# Patient Record
Sex: FEMALE | Race: WHITE
[De-identification: names, ages, dates, MRNs, and addresses within clinical notes are randomized per-mention and may not be internally consistent; named-entity substitution may affect disease eponyms.]

---

## 2019-10-13 ENCOUNTER — HOSPITAL ENCOUNTER (EMERGENCY)
Dept: HOSPITAL 75 - ER | Age: 29
Discharge: HOME | End: 2019-10-13
Payer: SELF-PAY

## 2019-10-13 VITALS — HEIGHT: 62.99 IN | BODY MASS INDEX: 39.06 KG/M2 | WEIGHT: 220.46 LBS

## 2019-10-13 VITALS — DIASTOLIC BLOOD PRESSURE: 68 MMHG | SYSTOLIC BLOOD PRESSURE: 135 MMHG

## 2019-10-13 DIAGNOSIS — K76.89: ICD-10-CM

## 2019-10-13 DIAGNOSIS — N12: Primary | ICD-10-CM

## 2019-10-13 DIAGNOSIS — Z87.42: ICD-10-CM

## 2019-10-13 DIAGNOSIS — Z87.442: ICD-10-CM

## 2019-10-13 DIAGNOSIS — I10: ICD-10-CM

## 2019-10-13 DIAGNOSIS — Z88.1: ICD-10-CM

## 2019-10-13 LAB
ALBUMIN SERPL-MCNC: 4.3 GM/DL (ref 3.2–4.5)
ALP SERPL-CCNC: 91 U/L (ref 40–136)
ALT SERPL-CCNC: 26 U/L (ref 0–55)
APTT PPP: YELLOW S
BACTERIA #/AREA URNS HPF: (no result) /HPF
BASOPHILS # BLD AUTO: 0.1 10^3/UL (ref 0–0.1)
BASOPHILS NFR BLD AUTO: 1 % (ref 0–10)
BILIRUB SERPL-MCNC: 0.4 MG/DL (ref 0.1–1)
BILIRUB UR QL STRIP: NEGATIVE
BUN/CREAT SERPL: 14
CALCIUM SERPL-MCNC: 8.8 MG/DL (ref 8.5–10.1)
CHLORIDE SERPL-SCNC: 104 MMOL/L (ref 98–107)
CO2 SERPL-SCNC: 24 MMOL/L (ref 21–32)
CREAT SERPL-MCNC: 0.84 MG/DL (ref 0.6–1.3)
EOSINOPHIL # BLD AUTO: 0.1 10^3/UL (ref 0–0.3)
EOSINOPHIL NFR BLD AUTO: 2 % (ref 0–10)
ERYTHROCYTE [DISTWIDTH] IN BLOOD BY AUTOMATED COUNT: 13.1 % (ref 10–14.5)
FIBRINOGEN PPP-MCNC: (no result) MG/DL
GFR SERPLBLD BASED ON 1.73 SQ M-ARVRAT: > 60 ML/MIN
GLUCOSE SERPL-MCNC: 119 MG/DL (ref 70–105)
GLUCOSE UR STRIP-MCNC: NEGATIVE MG/DL
HCT VFR BLD CALC: 39 % (ref 35–52)
HGB BLD-MCNC: 12.2 G/DL (ref 11.5–16)
KETONES UR QL STRIP: NEGATIVE
LEUKOCYTE ESTERASE UR QL STRIP: (no result)
LIPASE SERPL-CCNC: 19 U/L (ref 8–78)
LYMPHOCYTES # BLD AUTO: 2 X 10^3 (ref 1–4)
LYMPHOCYTES NFR BLD AUTO: 29 % (ref 12–44)
MANUAL DIFFERENTIAL PERFORMED BLD QL: NO
MCH RBC QN AUTO: 27 PG (ref 25–34)
MCHC RBC AUTO-ENTMCNC: 32 G/DL (ref 32–36)
MCV RBC AUTO: 86 FL (ref 80–99)
MONOCYTES # BLD AUTO: 0.4 X 10^3 (ref 0–1)
MONOCYTES NFR BLD AUTO: 6 % (ref 0–12)
NEUTROPHILS # BLD AUTO: 4.4 X 10^3 (ref 1.8–7.8)
NEUTROPHILS NFR BLD AUTO: 62 % (ref 42–75)
NITRITE UR QL STRIP: NEGATIVE
PH UR STRIP: 6 [PH] (ref 5–9)
PLATELET # BLD: 310 10^3/UL (ref 130–400)
PMV BLD AUTO: 11.4 FL (ref 7.4–10.4)
POTASSIUM SERPL-SCNC: 3.4 MMOL/L (ref 3.6–5)
PROT SERPL-MCNC: 7.6 GM/DL (ref 6.4–8.2)
PROT UR QL STRIP: (no result)
RBC #/AREA URNS HPF: (no result) /HPF
SODIUM SERPL-SCNC: 140 MMOL/L (ref 135–145)
SP GR UR STRIP: 1.02 (ref 1.02–1.02)
SQUAMOUS #/AREA URNS HPF: (no result) /HPF
UROBILINOGEN UR-MCNC: 4 MG/DL
WBC # BLD AUTO: 7 10^3/UL (ref 4.3–11)
WBC #/AREA URNS HPF: (no result) /HPF

## 2019-10-13 PROCEDURE — 74177 CT ABD & PELVIS W/CONTRAST: CPT

## 2019-10-13 PROCEDURE — 83690 ASSAY OF LIPASE: CPT

## 2019-10-13 PROCEDURE — 85025 COMPLETE CBC W/AUTO DIFF WBC: CPT

## 2019-10-13 PROCEDURE — 36415 COLL VENOUS BLD VENIPUNCTURE: CPT

## 2019-10-13 PROCEDURE — 86141 C-REACTIVE PROTEIN HS: CPT

## 2019-10-13 PROCEDURE — 87088 URINE BACTERIA CULTURE: CPT

## 2019-10-13 PROCEDURE — 81000 URINALYSIS NONAUTO W/SCOPE: CPT

## 2019-10-13 PROCEDURE — 84703 CHORIONIC GONADOTROPIN ASSAY: CPT

## 2019-10-13 PROCEDURE — 80053 COMPREHEN METABOLIC PANEL: CPT

## 2019-10-13 NOTE — DIAGNOSTIC IMAGING REPORT
PROCEDURE: CT abdomen and pelvis with contrast.



TECHNIQUE: Multiple contiguous axial images were obtained through

the abdomen and pelvis after administration of intravenous

contrast. Auto Exposure Controls were utilized during the CT exam

to meet ALARA standards for radiation dose reduction. 



INDICATION: Abdominal bloating.



COMPARISON: There are no prior studies available for comparison.



FINDINGS: The liver is not enlarged. However, there is a 1.3 x

3.6 cm fairly well-circumscribed area of diminished density in

the region of the gallbladder fossa. This finding is of uncertain

etiology. This could represent either a cystic lesion or a solid

mass. Furthermore, a hemangioma could also present in this

manner. Unfortunately, delayed series was not obtained. If

further evaluation is desired, then follow-up CT abdomen

examination with hemangioma protocol would be recommended.

Ultrasound could also provide additional information regarding

this finding. The liver is otherwise homogeneous.



The spleen, pancreas, adrenals, kidneys, aorta and inferior vena

cava show no sign of an acute abnormality. The gallbladder is not

well-distended and difficult to assess. Conversely the stomach is

filled with particulate matter and also difficult to evaluate.



There is no pelvic mass or free fluid collection evident. The

uterus is prominent but does not appear to be enlarged. The

appendix was visualized and is not abnormally thickened. There is

no pelvic mass or free fluid collection evident. The urinary

bladder is grossly unremarkable. The bone windows show no sign of

a fracture or of a destructive lesion.



The lung bases are clear.



IMPRESSION:

1. There is no evidence for acute abnormality of the abdomen or

pelvis.

2. The area of diminished density in the right lobe of liver near

the gallbladder is of uncertain etiology. Considerations and

recommendations as above.



Dictated by: 



  Dictated on workstation # [id number]

## 2019-10-13 NOTE — ED ABDOMINAL PAIN
General


Chief Complaint:  Abdominal/GI Problems


Stated Complaint:  ABD/BACK PAIN


Source of Information:  Patient, Family (daughter)


Exam Limitations:  No Limitations





History of Present Illness


Date Seen by Provider:  Oct 13, 2019


Time Seen by Provider:  14:25


Initial Comments


Patient present with chief complaint within the last couple hours she's had 

progressively worsening epigastric abdominal pain radiating through to her back.

No history of pancreatitis or gallstones or surgeries. She is not on anything 

for birth control. She has no dysuria but she is having some nausea without 

vomiting. Had a small stool earlier today. She denies any trauma. She had 

Chinese for lunch yesterday and did not eat last night when the pain started. 

Last night she took Gas-X and baking soda with water with no relief. This 

morning she woke up without pain and she forgets what she ate for lunch today 

but she says now she's having 10 out of 10 pain.





Allergies and Home Medications


Allergies


Coded Allergies:  


     cefaclor (Verified  Allergy, Intermediate, HIVES, 8/30/13)





Home Medications


Naproxen 500 Mg Tablet, 500 MG PO BID


   Prescribed by: FAUSTINA WOOD on 9/18/15 1930


Orphenadrine Citrate 100 Mg Tablet.er, 100 MG PO BID


   FOR MUSCLE SPASMS 


   Prescribed by: FAUSTINA WOOD on 9/18/15 1930


Phentermine Hcl 37.5 Mg Capsule, 37.5 MG PO DAILY, (Reported)





Patient Home Medication List


Home Medication List Reviewed:  Yes





Review of Systems


Review of Systems


Constitutional:  No chills, No diaphoresis, No fever


EENTM:  No Blurred Vision, No Double Vision


Respiratory:  Denies Cough, Denies Orthopnea


Cardiovascular:  Denies Chest Pain, Denies Lightheadedness


Gastrointestinal:  Denies Abdomen Distended, Denies Abdominal Pain, Denies 

Constipated, Denies Diarrhea; Nausea; Denies Poor Fluid Intake, Denies Vomiting


Genitourinary:  Denies Burning, Denies Discharge





Past Medical-Social-Family Hx


Patient Social History


Alcohol Use:  Denies Use


Recreational Drug Use:  No


Smoking Status:  Never a Smoker


Recent Foreign Travel:  No


Contact w/Someone Who Travel:  No





Seasonal Allergies


Seasonal Allergies:  Yes





Past Medical History


Hypertension


Reproductive Disorders:  No


Female Reproductive Disorders:  Ovarian Cyst, Polycystic Ovarian Dis


Sexually Transmitted Disease:  No


HIV/AIDS:  No


UTI-Chronic


Adverse Reaction/Blood Tranf:  No





Physical Exam


Vital Signs





Vital Signs - First Documented








 10/13/19





 14:35


 


Temp 37.2


 


Pulse 110


 


Resp 19


 


B/P (MAP) 149/65 (93)


 


Pulse Ox 100


 


O2 Delivery Room Air





Capillary Refill :


Height/Weight/BMI


Height: 5'4"


Weight: 192lbs. oz. 87.847314qb;  BMI


Method:Stated


General Appearance:  WD/WN, moderate distress


HEENT:  PERRL/EOMI, pharynx normal


Neck:  non-tender, full range of motion


Respiratory:  lungs clear, normal breath sounds, no respiratory distress, no 

accessory muscle use


Cardiovascular:  normal peripheral pulses, regular rate, rhythm, tachycardia


Peripheral Pulses:  2+ Radial Pulses (R), 2+ Radial Pulses (L)


Gastrointestinal:  normal bowel sounds (active), tenderness (epigastric negative

for Sellers sign, McBurney's point tenderness, psoas sign or other mesenteric 

signs.; )


Extremities:  normal inspection


Neurologic/Psychiatric:  alert, oriented x 3, other (tearful affect)


Skin:  normal color, warm/dry





Progress/Results/Core Measures


Results/Orders


Lab Results





Laboratory Tests








Test


 10/13/19


14:40 10/13/19


14:44 Range/Units


 


 


Urine Color YELLOW    


 


Urine Clarity


 SLIGHTLY


CLOUDY 


  





 


Urine pH 6   5-9  


 


Urine Specific Gravity 1.025 H  1.016-1.022  


 


Urine Protein 2+ H  NEGATIVE  


 


Urine Glucose (UA) NEGATIVE   NEGATIVE  


 


Urine Ketones NEGATIVE   NEGATIVE  


 


Urine Nitrite NEGATIVE   NEGATIVE  


 


Urine Bilirubin NEGATIVE   NEGATIVE  


 


Urine Urobilinogen 4 H  NORMAL  MG/DL


 


Urine Leukocyte Esterase 3+ H  NEGATIVE  


 


Urine RBC (Auto) 1+ H  NEGATIVE  


 


Urine RBC NONE    /HPF


 


Urine WBC 5-10 H   /HPF


 


Urine Squamous Epithelial


Cells 5-10 


 


  /HPF





 


Urine Crystals NONE    /LPF


 


Urine Bacteria FEW H   /HPF


 


Urine Casts NONE    /LPF


 


Urine Mucus NEGATIVE    /LPF


 


Urine Culture Indicated YES    


 


White Blood Count


 


 7.0 


 4.3-11.0


10^3/uL


 


Red Blood Count


 


 4.50 


 4.35-5.85


10^6/uL


 


Hemoglobin  12.2  11.5-16.0  G/DL


 


Hematocrit  39  35-52  %


 


Mean Corpuscular Volume  86  80-99  FL


 


Mean Corpuscular Hemoglobin  27  25-34  PG


 


Mean Corpuscular Hemoglobin


Concent 


 32 


 32-36  G/DL





 


Red Cell Distribution Width  13.1  10.0-14.5  %


 


Platelet Count


 


 310 


 130-400


10^3/uL


 


Mean Platelet Volume  11.4 H 7.4-10.4  FL


 


Neutrophils (%) (Auto)  62  42-75  %


 


Lymphocytes (%) (Auto)  29  12-44  %


 


Monocytes (%) (Auto)  6  0-12  %


 


Eosinophils (%) (Auto)  2  0-10  %


 


Basophils (%) (Auto)  1  0-10  %


 


Neutrophils # (Auto)  4.4  1.8-7.8  X 10^3


 


Lymphocytes # (Auto)  2.0  1.0-4.0  X 10^3


 


Monocytes # (Auto)  0.4  0.0-1.0  X 10^3


 


Eosinophils # (Auto)


 


 0.1 


 0.0-0.3


10^3/uL


 


Basophils # (Auto)


 


 0.1 


 0.0-0.1


10^3/uL


 


Sodium Level  140  135-145  MMOL/L


 


Potassium Level  3.4 L 3.6-5.0  MMOL/L


 


Chloride Level  104    MMOL/L


 


Carbon Dioxide Level  24  21-32  MMOL/L


 


Anion Gap  12  5-14  MMOL/L


 


Blood Urea Nitrogen  12  7-18  MG/DL


 


Creatinine


 


 0.84 


 0.60-1.30


MG/DL


 


Estimat Glomerular Filtration


Rate 


 > 60 


  





 


BUN/Creatinine Ratio  14   


 


Glucose Level  119 H   MG/DL


 


Calcium Level  8.8  8.5-10.1  MG/DL


 


Corrected Calcium  8.6  8.5-10.1  MG/DL


 


Total Bilirubin  0.4  0.1-1.0  MG/DL


 


Aspartate Amino Transf


(AST/SGOT) 


 22 


 5-34  U/L





 


Alanine Aminotransferase


(ALT/SGPT) 


 26 


 0-55  U/L





 


Alkaline Phosphatase  91    U/L


 


C-Reactive Protein High


Sensitivity 


 0.95 H


 0.00-0.50


MG/DL


 


Total Protein  7.6  6.4-8.2  GM/DL


 


Albumin  4.3  3.2-4.5  GM/DL


 


Lipase  19  8-78  U/L








My Orders





Orders - MAXI COTA


Ua Culture If Indicated (10/13/19 14:30)


Urine Pregnancy Bedside (10/13/19 14:30)


Ed Iv/Invasive Line Start (10/13/19 14:36)


Lactated Ringers (Lr 1000 Ml Iv Solution (10/13/19 14:36)


Ketorolac Injection (Toradol Injection) (10/13/19 14:45)


Ondansetron Injection (Zofran Injectio (10/13/19 14:45)


Cbc With Automated Diff (10/13/19 14:36)


Comprehensive Metabolic Panel (10/13/19 14:36)


Lipase (10/13/19 14:36)


Hs C Reactive Protein (10/13/19 14:36)


Ct Abdomen/Pelvis W (10/13/19 14:36)


Pantoprazole Injection (Protonix Injecti (10/13/19 14:45)


Iohexol Injection (Omnipaque 350 Mg/Ml 1 (10/13/19 15:00)


Sodium Chloride Flush (Catheter Flush Sy (10/13/19 15:00)


Ns (Ivpb) (Sodium Chloride 0.9% Ivpb Bag (10/13/19 15:00)


Received Contrast (Hold Metformin- Contr (10/13/19 15:00)


Urine Culture (10/13/19 14:40)


Hydrocodone/Apap 5/325 Tablet (Lortab 5 (10/13/19 17:30)





Medications Given in ED





Current Medications








 Medications  Dose


 Ordered  Sig/Asya


 Route  Start Time


 Stop Time Status Last Admin


Dose Admin


 


 Iohexol  100 ml  ONCE  ONCE


 IV  10/13/19 15:00


 10/13/19 15:01 DC 10/13/19 15:04


100 ML


 


 Ketorolac


 Tromethamine  30 mg  ONCE  ONCE


 IVP  10/13/19 14:45


 10/13/19 14:46 DC 10/13/19 14:52


30 MG


 


 Lactated Ringer's  1,000 ml @ 


 0 mls/hr  Q0M ONCE


 IV  10/13/19 14:36


 10/13/19 14:40 DC 10/13/19 14:50


0 MLS/HR


 


 Ondansetron HCl  8 mg  ONCE  ONCE


 IVP  10/13/19 14:45


 10/13/19 14:46 DC 10/13/19 14:53


8 MG


 


 Pantoprazole  40 mg  ONCE  ONCE


 IV  10/13/19 14:45


 10/13/19 14:46 DC 10/13/19 14:52


40 MG


 


 Sodium Chloride  10 ml  AS NEEDED  PRN


 IV  10/13/19 15:00


    10/13/19 15:04


10 ML


 


 Sodium Chloride  100 ml  ONCE  ONCE


 IV  10/13/19 15:00


 10/13/19 15:01 DC 10/13/19 15:04


80 ML








Vital Signs/I&O











 10/13/19





 14:35


 


Temp 37.2


 


Pulse 110


 


Resp 19


 


B/P (MAP) 149/65 (93)


 


Pulse Ox 100


 


O2 Delivery Room Air











Progress


Progress Note :  


   Time:  14:44


Progress Note


Aseptic vital signs. We'll obtain labs. We'll approach imaging if appropriate. 

Toradol, Zofran, pantoprazole. Pancreatitis, gastritis/esophagitis, less likely 

cholecystitis, colitis, enteritis.





Departure


Impression





   Primary Impression:  


   Pyelonephritis


   Additional Impression:  


   Hepatic lesion


Disposition:  01 HOME, SELF-CARE


Condition:  Stable





Departure-Patient Inst.


Decision time for Depature:  17:15


Referrals:  


[facility] (PCP/Family)


Primary Care Physician


Patient Instructions:  Kidney Infection (DC), LOCAL PHYSICIAN LIST





Add. Discharge Instructions:  


Drink lots of water.


Tylenol 650 mg every 8 hours as needed for pain.


Ibuprofen 400 mg every 8 hours as needed for pain.


Heating pads across the back and be helpful.


Ciprofloxacin one capsule twice a day for the next week.


Zofran 1 tablet every 6 hours under the tongue as needed for nausea.


Hydrocodone one tablet every 6 hours as needed for breakthrough pain daily from 

being functional.


Return to the ER if you're having intractable nausea or pain.


You have a lesion on the liver needs to be further characterized whether it's 

benign or worrisome. After you establish care with a primary care doctor he can 

have this further worked up in the next few months.


All discharge instructions reviewed with patient and/or family. Voiced 

understanding.


Scripts


Ondansetron HCl (Zofran) 4 Mg Tab


4 MG PO Q6H PRN for NAUSEA/VOMITING-1ST LINE, #10 TAB 0 Refills


   Prov: MAXI COTA         10/13/19 


Hydrocodone Bit/Acetaminophen (Hydrocodone/Acetaminophen 5/325mg Tablet) 1 Tab 

Tab


1 EACH PO Q4-6HR PRN for PAIN-MODERATE MDD 10 for 3 Days, #10 TAB 0 Refills


   Prov: MAXI COTA         10/13/19 


Ciprofloxacin HCl (Ciprofloxacin HCl) 500 Mg Tablet


500 MG PO BID for 7 Days, #14 TAB 0 Refills


   Prov: MAXI COTA         10/13/19


Work/School Note:  Work Release Form   Date Seen in the Emergency Department:  

Oct 13, 2019


   Return to Work:  Oct 14, 2019


   Restrictions:  No Restrictions











MAXI COTA                 Oct 13, 2019 14:44